# Patient Record
Sex: FEMALE | Employment: FULL TIME | URBAN - METROPOLITAN AREA
[De-identification: names, ages, dates, MRNs, and addresses within clinical notes are randomized per-mention and may not be internally consistent; named-entity substitution may affect disease eponyms.]

---

## 2024-01-12 PROBLEM — E55.9 VITAMIN D DEFICIENCY: Status: ACTIVE | Noted: 2024-01-12

## 2024-01-12 PROBLEM — E04.9 GOITER: Status: ACTIVE | Noted: 2024-01-12

## 2024-01-12 PROBLEM — K58.9 IBS (IRRITABLE BOWEL SYNDROME): Status: ACTIVE | Noted: 2024-01-12

## 2024-01-12 PROBLEM — R11.0 NAUSEA IN ADULT: Status: ACTIVE | Noted: 2024-01-12

## 2024-01-12 PROBLEM — U09.9 POST COVID-19 CONDITION, UNSPECIFIED: Status: ACTIVE | Noted: 2024-01-12

## 2024-01-12 PROBLEM — J02.9 SORE THROAT: Status: ACTIVE | Noted: 2024-01-12

## 2024-01-12 PROBLEM — Z86.16 PERSONAL HISTORY OF COVID-19: Status: ACTIVE | Noted: 2024-01-12

## 2024-01-12 PROBLEM — R32 BLADDER INCONTINENCE: Status: ACTIVE | Noted: 2024-01-12

## 2024-01-12 PROBLEM — R53.83 FATIGUE: Status: ACTIVE | Noted: 2024-01-12

## 2024-01-12 PROBLEM — L65.9 HAIR LOSS: Status: ACTIVE | Noted: 2024-01-12

## 2024-01-12 PROBLEM — M54.6 THORACIC BACK PAIN: Status: ACTIVE | Noted: 2024-01-12

## 2024-01-12 PROBLEM — R29.6 FALLS FREQUENTLY: Status: ACTIVE | Noted: 2024-01-12

## 2024-01-12 PROBLEM — M85.80 BONE LOSS: Status: ACTIVE | Noted: 2024-01-12

## 2024-01-12 PROBLEM — D25.9 UTERINE FIBROID: Status: ACTIVE | Noted: 2024-01-12

## 2024-01-12 PROBLEM — H93.13 TINNITUS OF BOTH EARS: Status: ACTIVE | Noted: 2024-01-12

## 2024-01-12 PROBLEM — F43.10 PTSD (POST-TRAUMATIC STRESS DISORDER): Status: ACTIVE | Noted: 2024-01-12

## 2024-01-12 PROBLEM — E78.5 HYPERLIPEMIA: Status: ACTIVE | Noted: 2024-01-12

## 2024-01-12 PROBLEM — G43.909 MIGRAINE: Status: ACTIVE | Noted: 2024-01-12

## 2024-01-12 PROBLEM — R41.89 BRAIN FOG: Status: ACTIVE | Noted: 2024-01-12

## 2024-01-12 PROBLEM — R42 POSTURAL DIZZINESS: Status: ACTIVE | Noted: 2024-01-12

## 2024-01-12 PROBLEM — R49.9 CHANGE IN VOICE: Status: ACTIVE | Noted: 2024-01-12

## 2024-01-12 PROBLEM — R94.4 DECREASED GFR: Status: ACTIVE | Noted: 2024-01-12

## 2024-01-12 PROBLEM — R87.610 ASCUS OF CERVIX WITH NEGATIVE HIGH RISK HPV: Status: ACTIVE | Noted: 2024-01-12

## 2024-01-12 RX ORDER — NORTRIPTYLINE HYDROCHLORIDE 75 MG/1
75 CAPSULE ORAL 2 TIMES DAILY
COMMUNITY
Start: 2015-10-29

## 2024-01-12 RX ORDER — ASPIRIN 325 MG
50000 TABLET, DELAYED RELEASE (ENTERIC COATED) ORAL
COMMUNITY
Start: 2022-09-20

## 2024-01-12 RX ORDER — BUPROPION HYDROCHLORIDE 150 MG/1
150 TABLET, EXTENDED RELEASE ORAL EVERY 12 HOURS
COMMUNITY
Start: 2015-10-29

## 2024-02-12 ENCOUNTER — TELEPHONE (OUTPATIENT)
Dept: OTHER | Facility: CLINIC | Age: 59
End: 2024-02-12

## 2024-02-12 NOTE — TELEPHONE ENCOUNTER
Left a message regarding surveys.  Patient given instructions on how to find and complete surveys.  Patient instructed to have surveys completed by Weds 2/14/24.

## 2024-02-16 ENCOUNTER — TELEPHONE (OUTPATIENT)
Dept: OTHER | Facility: CLINIC | Age: 59
End: 2024-02-16

## 2024-02-16 NOTE — TELEPHONE ENCOUNTER
COVID Recovery questionnaires were not completed as yet.  Patient was given a deadline of 2/19/24 end of business day to complete the forms to avoid the cancellation of the appointment.  If forms are not completed, appointment will be cancelled and the patient can call the office to reschedule if needed.       Unable to LM due to VM being full.    [employer] message sent.

## 2024-02-19 ENCOUNTER — TELEPHONE (OUTPATIENT)
Dept: OTHER | Facility: CLINIC | Age: 59
End: 2024-02-19

## 2024-02-19 NOTE — TELEPHONE ENCOUNTER
Left message for patient with reminder that the required COVID Recovery questionnaires were not completed as of yet. Patient was given a deadline of NOON TODAY to complete the forms to avoid the cancellation of the appointment.  If forms are not completed, appointment will be cancelled and the patient can call the office to reschedule if needed.

## 2024-02-20 NOTE — PROGRESS NOTES
{JTVisit:03532}    Subjective   COVID-19 Infection Date:  8/19/2021 (sx: fever, fatigue, SOB, headache, muscle pain, confusion - Hospitalized in [place] 8/31/2021-10/10/2021 including intubation and JANE and anoxic encephalopathy, followed by rehab for 10 days, discharged home on 2-3L O2)  March 2023 (sx: headache, congestion, body aches - was evaluated in ED, no specific COVID medications)    COVID-19 vaccine status: Pfizer 1/30/22, 2/20/22    Occupation: full-time remote [employer]     Current Providers: PCP-Dr. Quiros, NeuroPsychology Dr. Sinclair    Survey scores: 06/2022 -> 01/2023 -> 02/2024  PHQ-9: 12 -> 18 ->     DANILO-7: 14 -> 18 ->     Sleep Wellness: 9, snores -> 8 ->     FSS average: 3.333 -> 5 ->     Modified ECog average: 2.167 -> 3.25 ->     MOCA: 18/22 (01/2023) -> ??/22 (02/2024)  Overall Health: 75 ->     58 y.o. female with h/o COVID-19 in August 2021, anxiety and depression, goiter, HLD, IBS, migraine, tinnitus, [facility] with c/o     Fatigue,   brain fog/cognitive changes,   insomnia,   anxiety and depression,   dizziness and falls,   musculoskeletal pain,   weakness,   voice changes and sore throat.    Somewhat feeling improved  MRI was normal, cognitive testing was normal, that was relieving  Had COVID again in March, felt good again 4 days later, 4 days later developed vertigo pretty severely  Was started on Meclizine, that has been helpful, but still intermittently having symptoms so still taking it once per day  Has fallen twice in the last week  Some days wakes up without any aches or pains, other days wakes up feeling very poor in regards to pain  Weakness comes and goes as well  Wondering if related to mental health, notes that when stressed her symptoms recur such as trouble swallowing  Will be starting IOP in [place] when she goes back by May 1st   Working with therapist as well, wondering if PT would be helpful also  [facility]  Brought [place] paperwork but doesn't think she will need it, will be starting a new job May 8th , 100% remote  Can do IOP in the evening  Has been avoiding alcohol, focusing on self-care  Will be doing sleep study tonight    ROS from surveys: (  )      Relevant prior healthcare visits:  -10/2021 PCP referred to [facility] recovery clinic for PASC symptoms, pulmonary for hypoxia (currently on 2L NC), and behavioral health for depression  -11/2021 PCP notes cognitive changes post COVID, was able to wean off O2  -02/2022 PCP notes pulmonologist told her lungs are clear, still having brain fog and hair loss, ordered blood work  -03/2023 Neuropsychology notes evaluation showed largely intact performance across all assessed cognitive domains. The most striking aspect of the obtained results was the degree of psychiatric distress present, with self-report measures indicating severe depression and anxiety and probably PTSD. Sleep disturbance and fatigue are also likely contributory. Referred to IOP, sleep hygiene, increased water intake, and f/u on previously recommended evaluations and tests    Relevant prior diagnostic studies:  -03/2023 MRI brain unremarkable  -04/2023 tilt table test inconclusive due to improper cessation of Amitriptyline likely causing disturbances in cardiac parasympathetic, cardiac sympathetic, and sudomotor function. Tilt table test did not indicate any signs of orthostatic hypotension or tachycardia. Symptoms of dizziness with no accompanying hemodynamic changes.    Relevant prior laboratory values, unremarkable unless noted:  -09/2022 CBC/D, Mag, ESR, CMP, CRP, iron studies, BNP, TSH elevated, T3, HbA1c, AM cortisol elevated, Vitamin D 12, Vitamin B12 580, RF, Ferritin, SPEP, Vitamin B2, Vitamin B6, Vitamin B1    Exercise routine:  Diet:  Weight hx: pre-COVID-19    lbs -> post-COVID-19    lbs  Substance use: current tobacco use, *** servings ETOH *** times per  {frequency:72368}  Social:       Current Outpatient Medications:     buPROPion SR (Wellbutrin SR) 150 mg 12 hr tablet, Take 1 tablet (150 mg) by mouth every 12 hours., Disp: , Rfl:     cholecalciferol (Vitamin D-3) 50,000 unit capsule, Take 1 capsule (50,000 Units) by mouth., Disp: , Rfl:     nortriptyline (Pamelor) 75 mg capsule, Take 1 capsule (75 mg) by mouth 2 times a day., Disp: , Rfl:     Past Medical History:   Diagnosis Date    Personal history of other specified conditions 04/18/2019    History of palpitations       No past surgical history on file.    No family history on file.    Objective   There were no vitals taken for this visit.    Physical Exam    Assessment/Plan   {Assess/PlanSmartLinks:98614}

## 2024-02-20 NOTE — ASSESSMENT & PLAN NOTE
02/2024:      04/2023:  Fatigue, brain fog/cognitive changes, insomnia, anxiety and depression, dizziness and falls, musculoskeletal pain, weakness, voice changes and sore throat  -repeat Vitamin levels  -prescription for Meloxicam to use once daily as needed for musculoskeletal pain  -ENT referral for dizziness, voice changes, sore throat  -referral to Physical therapy for fatigue and dizziness  -proceed with IOP as planned  -proceed with .hsat  -Fatigue and Brain fog booklet to help manage symptoms  -[email or web address]  -continue to focus on lifestyle adjustments and self-care by avoiding alcohol, staying well hydrated, eating a whole foods diet rich in plant-based protein, sleep hygiene, daily physical activity    10/2022:  fatigue, brain fog/cognitive changes, insomnia, anxiety, dizziness and falls, voice changes and sore throat.  -start Vitamin D supplement and repeat AM cortisol level  -I ordered an MRI of your brain  -referral to Psychiatrist Dr. Dumas  -referral to Neuropsychology rehabilitation  -use a timer on your audible david so it turns off after you fall asleep with it  -proceed with your ordered sleep study  -discuss stopping psychiatric medications with Psychiatrist before attempting tilt table test again  -OK to hold off on rescheduling ENT visit as voice changes have improved, will continue to monitor  .brain  .sleep    06/2022:  frequent falls, dizziness, nausea with ambulation, bone loss, hair loss, anxiety, PTSD, voice changes and sore throat, fatigue, brain fog, increased alcohol intake  .blood  .hsat  .tilt  -we will consider physical therapy after your tilt table testing is completed  -[facility] referral to address anxiety and PTSD symptoms  -ENT referral for vocal cord assessment  .fog  .brain  .sleep  Repeat AM cortisol, f/u PCP on elevated TSH and normal T4, D 50K twice weekly  Neurology  referral per patient request

## 2024-02-26 ENCOUNTER — APPOINTMENT (OUTPATIENT)
Dept: OTHER | Facility: CLINIC | Age: 59
End: 2024-02-26

## 2024-02-26 ENCOUNTER — DOCUMENTATION (OUTPATIENT)
Dept: OTHER | Facility: CLINIC | Age: 59
End: 2024-02-26

## 2024-02-26 NOTE — PROGRESS NOTES
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 Quiros, NeuroPsychology Dr. Sinclair    Survey scores: 06/2022 -> 01/2023 -> 02/2024  PHQ-9: 12 -> 18 ->     DANILO-7: 14 -> 18 ->     Sleep Wellness: 9, snores -> 8 ->     FSS average: 3.333 -> 5 ->     Modified ECog average: 2.167 -> 3.25 ->     MOCA: 18/22 (01/2023) -> ??/22 (02/2024)  Overall Health: 75 ->     58 y.o. female with h/o COVID-19 in August 2021, anxiety and depression, goiter, HLD, IBS, migraine, tinnitus, presents for follow-up at the  [facility] with c/o     Fatigue,   brain fog/cognitive changes,   insomnia,   anxiety and depression,   dizziness and falls,   musculoskeletal pain,   weakness,   voice changes and sore throat.    Somewhat feeling improved  MRI was normal, cognitive testing was normal, that was relieving  Had COVID again in March, felt good again 4 days later, 4 days later developed vertigo pretty severely  Was started on Meclizine, that has been helpful, but still intermittently having symptoms so still taking it once per day  Has fallen twice in the last week  Some days wakes up without any aches or pains, other days wakes up feeling very poor in regards to pain  Weakness comes and goes as well  Wondering if related to mental health, notes that when stressed her symptoms recur such as trouble swallowing  Will be starting IOP in [place] when she goes back by May 1st   Working with  therapist as well, wondering if PT would be helpful also  Will be seeing ENT in [place], not until September  Brought FMLA paperwork but doesn't think she will need it, will be starting a new job May 8th , 100% remote  Can do IOP in the evening  Has been avoiding alcohol, focusing on self-care  Will be doing sleep study tonight    ROS from surveys: (  )      Relevant prior healthcare visits:  -10/2021 PCP referred to [facility] recovery clinic for PASC symptoms, pulmonary for hypoxia (currently on 2L NC), and behavioral health for depression  -11/2021 PCP notes cognitive changes post COVID, was able to wean off O2  -02/2022 PCP notes pulmonologist told her lungs are clear, still having brain fog and hair loss, ordered blood work  -03/2023 Neuropsychology notes evaluation showed largely intact performance across all assessed cognitive domains. The most striking aspect of the obtained results was the degree of psychiatric distress present, with self-report measures indicating severe depression and anxiety and probably PTSD. Sleep disturbance and fatigue are also likely contributory. Referred to IOP, sleep hygiene, increased water intake, and f/u on previously recommended evaluations and tests    Relevant prior diagnostic studies:  -03/2023 MRI brain unremarkable  -04/2023 tilt table test inconclusive due to improper cessation of Amitriptyline likely causing disturbances in cardiac parasympathetic, cardiac sympathetic, and sudomotor function. Tilt table test did not indicate any signs of orthostatic hypotension or tachycardia. Symptoms of dizziness with no accompanying hemodynamic changes.    Relevant prior laboratory values, unremarkable unless noted:  -09/2022 CBC/D, Mag, ESR, CMP, CRP, iron studies, BNP, TSH elevated, T3, HbA1c, AM cortisol elevated, Vitamin D 12, Vitamin B12 580, RF, Ferritin, SPEP, Vitamin B2, Vitamin B6, Vitamin B1    Exercise routine:  Diet:  Weight hx: pre-COVID-19    lbs -> post-COVID-19     lbs  Substance use: current tobacco use,   Social:       Current Outpatient Medications:     buPROPion SR (Wellbutrin SR) 150 mg 12 hr tablet, Take 1 tablet (150 mg) by mouth every 12 hours., Disp: , Rfl:     cholecalciferol (Vitamin D-3) 50,000 unit capsule, Take 1 capsule (50,000 Units) by mouth., Disp: , Rfl:     nortriptyline (Pamelor) 75 mg capsule, Take 1 capsule (75 mg) by mouth 2 times a day., Disp: , Rfl:     Past Medical History:   Diagnosis Date    Personal history of other specified conditions 04/18/2019    History of palpitations       No past surgical history on file.    No family history on file.    Objective   There were no vitals taken for this visit.    Physical Exam    Assessment/Plan

## 2024-02-27 ENCOUNTER — APPOINTMENT (OUTPATIENT)
Dept: OTHER | Facility: CLINIC | Age: 59
End: 2024-02-27